# Patient Record
Sex: MALE | Race: WHITE | URBAN - METROPOLITAN AREA
[De-identification: names, ages, dates, MRNs, and addresses within clinical notes are randomized per-mention and may not be internally consistent; named-entity substitution may affect disease eponyms.]

---

## 2017-04-12 ENCOUNTER — TRANSFERRED RECORDS (OUTPATIENT)
Dept: HEALTH INFORMATION MANAGEMENT | Facility: CLINIC | Age: 72
End: 2017-04-12

## 2018-04-09 ENCOUNTER — TRANSFERRED RECORDS (OUTPATIENT)
Dept: HEALTH INFORMATION MANAGEMENT | Facility: CLINIC | Age: 73
End: 2018-04-09

## 2018-04-25 ENCOUNTER — TRANSFERRED RECORDS (OUTPATIENT)
Dept: HEALTH INFORMATION MANAGEMENT | Facility: CLINIC | Age: 73
End: 2018-04-25

## 2018-05-09 ENCOUNTER — TRANSFERRED RECORDS (OUTPATIENT)
Dept: HEALTH INFORMATION MANAGEMENT | Facility: CLINIC | Age: 73
End: 2018-05-09

## 2018-05-14 ENCOUNTER — OFFICE VISIT (OUTPATIENT)
Dept: OPHTHALMOLOGY | Facility: CLINIC | Age: 73
End: 2018-05-14
Attending: OPHTHALMOLOGY
Payer: MEDICARE

## 2018-05-14 DIAGNOSIS — H40.32X0: Primary | ICD-10-CM

## 2018-05-14 DIAGNOSIS — H40.32X3 TRAUMATIC GLAUCOMA, LEFT, SEVERE STAGE: ICD-10-CM

## 2018-05-14 PROCEDURE — G0463 HOSPITAL OUTPT CLINIC VISIT: HCPCS | Mod: ZF

## 2018-05-14 PROCEDURE — 92133 CPTRZD OPH DX IMG PST SGM ON: CPT | Mod: ZF | Performed by: OPHTHALMOLOGY

## 2018-05-14 PROCEDURE — 92083 EXTENDED VISUAL FIELD XM: CPT | Mod: ZF | Performed by: OPHTHALMOLOGY

## 2018-05-14 PROCEDURE — 92020 GONIOSCOPY: CPT | Mod: ZF | Performed by: OPHTHALMOLOGY

## 2018-05-14 RX ORDER — METOPROLOL SUCCINATE 50 MG/1
50 TABLET, EXTENDED RELEASE ORAL DAILY
COMMUNITY
Start: 2018-05-01

## 2018-05-14 RX ORDER — GABAPENTIN 300 MG/1
300 CAPSULE ORAL DAILY
COMMUNITY
Start: 2018-03-05

## 2018-05-14 RX ORDER — DORZOLAMIDE HCL 20 MG/ML
1 SOLUTION/ DROPS OPHTHALMIC
COMMUNITY
Start: 2013-09-06

## 2018-05-14 RX ORDER — L. ACIDOPHILUS/PECTIN, CITRUS 25MM-100MG
1 TABLET ORAL
COMMUNITY

## 2018-05-14 RX ORDER — WARFARIN SODIUM 10 MG/1
10 TABLET ORAL DAILY
COMMUNITY
Start: 2018-03-05

## 2018-05-14 RX ORDER — LANCETS
EACH MISCELLANEOUS
COMMUNITY
Start: 2014-04-29

## 2018-05-14 RX ORDER — GLUCOSAMINE HCL/CHONDROITIN SU 500-400 MG
CAPSULE ORAL
COMMUNITY
Start: 2018-03-08

## 2018-05-14 RX ORDER — ATORVASTATIN CALCIUM 40 MG/1
40 TABLET, FILM COATED ORAL AT BEDTIME
COMMUNITY
Start: 2017-09-16

## 2018-05-14 RX ORDER — KETOCONAZOLE 20 MG/ML
SHAMPOO TOPICAL
COMMUNITY
Start: 2014-12-02

## 2018-05-14 RX ORDER — ALLOPURINOL 100 MG/1
200 TABLET ORAL 2 TIMES DAILY
COMMUNITY
Start: 2018-05-01

## 2018-05-14 RX ORDER — LISINOPRIL 40 MG/1
40 TABLET ORAL DAILY
COMMUNITY
Start: 2017-09-16

## 2018-05-14 RX ORDER — DILTIAZEM HYDROCHLORIDE 240 MG/1
240 CAPSULE, COATED, EXTENDED RELEASE ORAL DAILY
COMMUNITY
Start: 2018-05-01

## 2018-05-14 RX ORDER — GUAIFENESIN 600 MG/1
600 TABLET, EXTENDED RELEASE ORAL 2 TIMES DAILY
COMMUNITY
Start: 2018-04-30

## 2018-05-14 RX ORDER — LATANOPROST 50 UG/ML
1 SOLUTION/ DROPS OPHTHALMIC AT BEDTIME
COMMUNITY
Start: 2017-12-04

## 2018-05-14 ASSESSMENT — CONF VISUAL FIELD
OS_INFERIOR_NASAL_RESTRICTION: 3
OS_SUPERIOR_TEMPORAL_RESTRICTION: 1
METHOD: COUNTING FINGERS
OS_SUPERIOR_NASAL_RESTRICTION: 1
OD_NORMAL: 1

## 2018-05-14 ASSESSMENT — VISUAL ACUITY
OD_CC+: -3
OS_CC: CF @ 3'
OD_BAT_HIGH: 20/25-2
OD_BAT_MED: 20/25-3
OD_CC: 20/25
OD_BAT_LOW: 20/25-3
METHOD: SNELLEN - LINEAR

## 2018-05-14 ASSESSMENT — SLIT LAMP EXAM - LIDS
COMMENTS: NORMAL
COMMENTS: NORMAL

## 2018-05-14 ASSESSMENT — REFRACTION_WEARINGRX
OS_ADD: +2.00
OD_AXIS: 168
OD_CYLINDER: +1.75
OD_SPHERE: +0.25
OD_ADD: +2.00
OS_CYLINDER: +1.00
OS_SPHERE: +0.25
OS_AXIS: 003

## 2018-05-14 ASSESSMENT — TONOMETRY
IOP_METHOD: APPLANATION
OS_IOP_MMHG: 11
OD_IOP_MMHG: 10

## 2018-05-14 ASSESSMENT — PACHYMETRY
OD_CT(UM): 577
OS_CT(UM): 597

## 2018-05-14 NOTE — NURSING NOTE
Chief Complaints and History of Present Illnesses   Patient presents with     Consult For     evaluation of trab/cat LE>RE     HPI    Additional Referring Providers:  Referred by Dr Delacruz   Affected eye(s):  Both   Symptoms:        Frequency:  Constant       Do you have eye pain now?:  No      Comments:  Aurther is here today for an evaluation for Trab / cat LE  He says he had a retinal detachment LE  when he was a teen, that is now bothering him. He says he has glaucoma LE and LE is light sensitive.     Josh Sahni COT 1:25 PM May 14, 2018
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
## 2018-05-14 NOTE — MR AVS SNAPSHOT
After Visit Summary   5/14/2018    Rashaad Falcon    MRN: [id number]           Patient Information     Date Of Birth          1945        Visit Information        Provider Department      5/14/2018 12:45 PM Alexandrea Sarabia MD [facility]        Today's Diagnoses     Traumatic glaucoma, left    -  1    Traumatic glaucoma, left, severe stage           Follow-ups after your visit        Your next 10 appointments already scheduled     May 16, 2018  8:00 AM CDT   Post-Op with Alexandrea Sarabia MD   Eye Clinic ([facility])    [address]  [address] [address]   [phone]              Future tests that were ordered for you today     Open Future Orders        Priority Expected Expires Ordered    Pachymetry OU (both eyes) Routine  11/14/2019 5/14/2018            Who to contact     Please call your clinic at [phone] to:    Ask questions about your health    Make or cancel appointments    Discuss your medicines    Learn about your test results    Speak to your doctor            Additional Information About Your Visit        Smartesting[employer]Ygle Information     [employer] gives you secure access to your electronic health record. If you see a primary care provider, you can also send messages to your care team and make appointments. If you have questions, please call your primary care clinic.  If you do not have a primary care provider, please call [phone] and they will assist you.      [employer] is an electronic gateway that provides easy, online access to your medical records. With [employer], you can request a clinic appointment, read your test results, renew a prescription or communicate with your care team.     To access your existing account, please contact your [facility] or call [phone] for assistance.        Care EveryWhere ID     This is your Care EveryWhere ID. This could be used by other organizations to  access your [place] medical records  [id number]         Blood Pressure from Last 3 Encounters:   No data found for BP    Weight from Last 3 Encounters:   No data found for Wt              We Performed the Following     GONIOSCOPY     OCT Optic Nerve RNFL Spectralis OU (both eyes)     OVF 24-2 Dynamic OD     Gris-Operative Worksheet (Glaucoma)        Primary Care Provider Office Phone # Fax #    Tyler Patel MD, [phone]       [facility] [address] [place] [address]        Equal Access to Services     [facility]: Hadii aad ku hadasho Soomaali, waaxda luqadaha, qaybta kaalmada adeegyada, waxay idiin hayaan adeeg kharash laLuciaaan . So [facility] [phone].    ATENCIÓN: Si habla español, tiene a puentes disposición servicios gratuitos de asistencia lingüística. [facility] [phone].    We comply with applicable federal civil rights laws and [place] laws. We do not discriminate on the basis of race, color, national origin, age, disability, sex, sexual orientation, or gender identity.            Thank you!     Thank you for choosing EYE CLINIC  for your care. Our goal is always to provide you with excellent care. Hearing back from our patients is one way we can continue to improve our services. Please take a few minutes to complete the written survey that you may receive in the mail after your visit with us. Thank you!             Your Updated Medication List - Protect others around you: Learn how to safely use, store and throw away your medicines at www.disposemymeds.org.          This list is accurate as of 5/14/18  3:44 PM.  Always use your most recent med list.                   Brand Name Dispense Instructions for use Diagnosis    allopurinol 100 MG tablet    ZYLOPRIM     Take 200 mg by mouth 2 times daily        aspirin 81 MG EC tablet      Take 81 mg by mouth daily        atorvastatin 40 MG tablet    LIPITOR     Take 40 mg by mouth At Bedtime        BLOOD GLUCOSE TEST  STRIPS Strp      Test blood sugar once daily. ICD-10-CM: E11.42        diclofenac 1 % Gel topical gel    VOLTAREN     Apply 4 g topically 4 times daily        diltiazem 240 MG 24 hr capsule      Take 240 mg by mouth daily        dorzolamide 2 % ophthalmic solution    TRUSOPT     Place 1 drop Into the left eye 2 times daily        gabapentin 300 MG capsule    NEURONTIN     Take 300 mg by mouth daily        guaiFENesin 600 MG 12 hr tablet    MUCINEX     Take 600 mg by mouth 2 times daily        ketoconazole 2 % shampoo    NIZORAL     Use 3 times weekly. Apply to affected area, leave on for 5min, then rinse.        lactobacillus acidophilus Tabs      Take 1 tablet by mouth 3 times daily (before meals)        latanoprost 0.005 % ophthalmic solution    XALATAN     Place 1 drop into both eyes At Bedtime        lisinopril 40 MG tablet    PRINIVIL/ZESTRIL     Take 40 mg by mouth daily        MAG64 535 (64 Mg) MG Tbec CR tablet   Generic drug:  magnesium chloride      Take 535 mg by mouth 2 times daily        metFORMIN 500 MG tablet    GLUCOPHAGE     Take 500 mg by mouth 2 times daily (with meals)        metoprolol succinate 50 MG 24 hr tablet    TOPROL-XL     Take 50 mg by mouth daily        MICROLET LANCETS Misc      Use to test blood sugar twice daily        vitamin b complex w/vitamin C Tabs tablet      Take 1 tablet by mouth daily        warfarin 10 MG tablet    COUMADIN     Take 10 mg by mouth daily
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
## 2018-05-15 ENCOUNTER — TRANSFERRED RECORDS (OUTPATIENT)
Dept: HEALTH INFORMATION MANAGEMENT | Facility: CLINIC | Age: 73
End: 2018-05-15

## 2018-05-16 ENCOUNTER — OFFICE VISIT (OUTPATIENT)
Dept: OPHTHALMOLOGY | Facility: CLINIC | Age: 73
End: 2018-05-16
Attending: OPHTHALMOLOGY
Payer: MEDICARE

## 2018-05-16 DIAGNOSIS — Z48.810 AFTERCARE FOLLOWING SURGERY OF A SENSORY ORGAN: Primary | ICD-10-CM

## 2018-05-16 PROCEDURE — G0463 HOSPITAL OUTPT CLINIC VISIT: HCPCS | Mod: ZF

## 2018-05-16 ASSESSMENT — SLIT LAMP EXAM - LIDS
COMMENTS: NORMAL
COMMENTS: NORMAL

## 2018-05-16 ASSESSMENT — TONOMETRY
OD_IOP_MMHG: 11
IOP_METHOD: APPLANATION-MMW
OS_IOP_MMHG: 10
IOP_METHOD: TONOPEN
OS_IOP_MMHG: 21

## 2018-05-16 ASSESSMENT — REFRACTION_WEARINGRX
OD_SPHERE: +0.25
OD_AXIS: 168
OD_CYLINDER: +1.75
OS_ADD: +2.00
OS_SPHERE: +0.25
OS_CYLINDER: +1.00
OD_ADD: +2.00
OS_AXIS: 003

## 2018-05-16 ASSESSMENT — VISUAL ACUITY
OD_SC+: -1
OS_SC: 2'/200E
OD_SC: 20/25
METHOD: SNELLEN - LINEAR

## 2018-05-16 NOTE — NURSING NOTE
Chief Complaints and History of Present Illnesses   Patient presents with     Post Op (Ophthalmology) Left Eye     Trabeculectomy mitomycin-C with cataract extraction and intraocular lens left eye      HPI    Affected eye(s):  Left   Symptoms:        Duration:  1 day   Frequency:  Constant       Do you have eye pain now?:  No      Comments:  Pt. States that yesterday went well.  No c/o comfort BE.  Was able to sleep well.  Tana DODSON 8:09 AM May 16, 2018

## 2018-05-16 NOTE — MR AVS SNAPSHOT
After Visit Summary   5/16/2018    Rashaad Falcon    MRN: [id number]           Patient Information     Date Of Birth          1945        Visit Information        Provider Department      5/16/2018 8:00 AM Alexandrea Saarbia MD [facility]        Today's Diagnoses     Aftercare following surgery of a sensory organ    -  1       Follow-ups after your visit        Who to contact     Please call your clinic at [phone] to:    Ask questions about your health    Make or cancel appointments    Discuss your medicines    Learn about your test results    Speak to your doctor            Additional Information About Your Visit        MyChart Information     [employer] gives you secure access to your electronic health record. If you see a primary care provider, you can also send messages to your care team and make appointments. If you have questions, please call your primary care clinic.  If you do not have a primary care provider, please call [phone] and they will assist you.      [employer] is an electronic gateway that provides easy, online access to your medical records. With [employer], you can request a clinic appointment, read your test results, renew a prescription or communicate with your care team.     To access your existing account, please contact your [facility] or call [phone] for assistance.        Care EveryWhere ID     This is your Care EveryWhere ID. This could be used by other organizations to access your [place] medical records  [id number]         Blood Pressure from Last 3 Encounters:   No data found for BP    Weight from Last 3 Encounters:   No data found for Wt              Today, you had the following     No orders found for display       Primary Care Provider Office Phone # Fax #    Tyler Patel MD, [phone]       [facility] [address] [place] [place] [address]        Equal Access to Services     JERROD SALAZAR AH:  Hadii holden ku hadlouo Soomaali, waaxda luqadaha, qaybta kaalmada ramon, hunter polyhaily abreu. So [facility] [phone].    ATENCIÓN: Si ibeth wood, tiene a puentes disposición servicios gratuitos de asistencia lingüística. Llame al [phone].    We comply with applicable federal civil rights laws and [place] laws. We do not discriminate on the basis of race, color, national origin, age, disability, sex, sexual orientation, or gender identity.            Thank you!     Thank you for choosing EYE CLINIC  for your care. Our goal is always to provide you with excellent care. Hearing back from our patients is one way we can continue to improve our services. Please take a few minutes to complete the written survey that you may receive in the mail after your visit with us. Thank you!             Your Updated Medication List - Protect others around you: Learn how to safely use, store and throw away your medicines at www.disposemymeds.org.          This list is accurate as of 5/16/18  8:45 AM.  Always use your most recent med list.                   Brand Name Dispense Instructions for use Diagnosis    allopurinol 100 MG tablet    ZYLOPRIM     Take 200 mg by mouth 2 times daily        aspirin 81 MG EC tablet      Take 81 mg by mouth daily        atorvastatin 40 MG tablet    LIPITOR     Take 40 mg by mouth At Bedtime        BLOOD GLUCOSE TEST STRIPS Strp      Test blood sugar once daily. ICD-10-CM: E11.42        diclofenac 1 % Gel topical gel    VOLTAREN     Apply 4 g topically 4 times daily        diltiazem 240 MG 24 hr capsule      Take 240 mg by mouth daily        dorzolamide 2 % ophthalmic solution    TRUSOPT     Place 1 drop Into the left eye 2 times daily        gabapentin 300 MG capsule    NEURONTIN     Take 300 mg by mouth daily        guaiFENesin 600 MG 12 hr tablet    MUCINEX     Take 600 mg by mouth 2 times daily        ketoconazole 2 % shampoo    NIZORAL     Use 3 times weekly. Apply to  affected area, leave on for 5min, then rinse.        lactobacillus acidophilus Tabs      Take 1 tablet by mouth 3 times daily (before meals)        latanoprost 0.005 % ophthalmic solution    XALATAN     Place 1 drop into both eyes At Bedtime        lisinopril 40 MG tablet    PRINIVIL/ZESTRIL     Take 40 mg by mouth daily        MAG64 535 (64 Mg) MG Tbec CR tablet   Generic drug:  magnesium chloride      Take 535 mg by mouth 2 times daily        metFORMIN 500 MG tablet    GLUCOPHAGE     Take 500 mg by mouth 2 times daily (with meals)        metoprolol succinate 50 MG 24 hr tablet    TOPROL-XL     Take 50 mg by mouth daily        MICROLET LANCETS Misc      Use to test blood sugar twice daily        vitamin b complex w/vitamin C Tabs tablet      Take 1 tablet by mouth daily        warfarin 10 MG tablet    COUMADIN     Take 10 mg by mouth daily
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
## 2018-05-17 ENCOUNTER — TELEPHONE (OUTPATIENT)
Dept: OPHTHALMOLOGY | Facility: CLINIC | Age: 73
End: 2018-05-17

## 2018-05-17 DIAGNOSIS — Z98.890 POSTSURGICAL STATE, EYE: Primary | ICD-10-CM

## 2018-05-17 RX ORDER — PREDNISOLONE ACETATE 10 MG/ML
1 SUSPENSION/ DROPS OPHTHALMIC
Qty: 1 BOTTLE | Refills: 2 | Status: SHIPPED | OUTPATIENT
Start: 2018-05-17

## 2018-05-17 NOTE — TELEPHONE ENCOUNTER
[facility] Call Center    Phone Message    May a detailed message be left on voicemail: yes    Reason for Call: Other: Pt's wife, Nancy is requesting a refill for Rx Prednisolone Acetate to their local pharmacy because Pt was only given enough for 5 days and realized that he won't have enough to last him until his appt with his local eye doctor next week. Pt's pharmacy is [facility],  # [phone]. Please call nancy at her mobile [phone] with question or please refill asap.       Action Taken: Message routed to:  [facility] ([facility]

## 2020-03-11 ENCOUNTER — HEALTH MAINTENANCE LETTER (OUTPATIENT)
Age: 75
End: 2020-03-11

## 2021-01-03 ENCOUNTER — HEALTH MAINTENANCE LETTER (OUTPATIENT)
Age: 76
End: 2021-01-03

## 2021-04-25 ENCOUNTER — HEALTH MAINTENANCE LETTER (OUTPATIENT)
Age: 76
End: 2021-04-25

## 2021-10-10 ENCOUNTER — HEALTH MAINTENANCE LETTER (OUTPATIENT)
Age: 76
End: 2021-10-10

## 2022-05-21 ENCOUNTER — HEALTH MAINTENANCE LETTER (OUTPATIENT)
Age: 77
End: 2022-05-21

## 2022-09-18 ENCOUNTER — HEALTH MAINTENANCE LETTER (OUTPATIENT)
Age: 77
End: 2022-09-18

## 2023-06-04 ENCOUNTER — HEALTH MAINTENANCE LETTER (OUTPATIENT)
Age: 78
End: 2023-06-04